# Patient Record
Sex: MALE | Race: WHITE | Employment: OTHER | URBAN - METROPOLITAN AREA
[De-identification: names, ages, dates, MRNs, and addresses within clinical notes are randomized per-mention and may not be internally consistent; named-entity substitution may affect disease eponyms.]

---

## 2018-03-15 PROBLEM — M19.90 ARTHRITIS: Status: ACTIVE | Noted: 2018-03-15

## 2018-03-27 PROBLEM — M06.00 SERONEGATIVE RHEUMATOID ARTHRITIS (HCC): Status: ACTIVE | Noted: 2018-03-27

## 2018-03-27 PROBLEM — M19.90 ARTHRITIS: Status: RESOLVED | Noted: 2018-03-15 | Resolved: 2018-03-27

## 2018-12-18 PROBLEM — H60.552 ACUTE REACTIVE OTITIS EXTERNA OF LEFT EAR: Status: ACTIVE | Noted: 2018-12-18

## 2018-12-18 PROBLEM — K14.0 TONGUE ULCERATION: Status: ACTIVE | Noted: 2018-12-06

## 2018-12-18 PROBLEM — E66.01 SEVERE OBESITY (HCC): Status: ACTIVE | Noted: 2018-12-18

## 2019-06-24 ENCOUNTER — HOSPITAL ENCOUNTER (OUTPATIENT)
Dept: GENERAL RADIOLOGY | Age: 74
Discharge: HOME | End: 2019-06-24
Attending: FAMILY MEDICINE
Payer: MEDICARE

## 2019-06-24 DIAGNOSIS — R05.9 COUGH: ICD-10-CM

## 2019-06-24 DIAGNOSIS — R06.02 SHORTNESS OF BREATH: ICD-10-CM

## 2019-06-24 PROBLEM — R00.0 TACHYCARDIA: Status: ACTIVE | Noted: 2019-06-24

## 2019-06-24 PROBLEM — J01.40 ACUTE NON-RECURRENT PANSINUSITIS: Status: ACTIVE | Noted: 2019-06-24

## 2019-06-24 PROBLEM — R73.01 ELEVATED FASTING GLUCOSE: Status: ACTIVE | Noted: 2019-06-24

## 2019-06-24 PROCEDURE — 71046 X-RAY EXAM CHEST 2 VIEWS: CPT

## 2019-06-25 NOTE — PROGRESS NOTES
Please call and advise CXR pos for pneumonia on both sides.   Needs to be seen for f/u this coming Thurs or Jonna Vivar MD

## 2019-09-23 PROBLEM — I69.322 CVA, OLD, DYSARTHRIA: Status: ACTIVE | Noted: 2019-09-23

## 2019-09-23 PROBLEM — I62.9 INTRACRANIAL HEMORRHAGE (HCC): Status: ACTIVE | Noted: 2019-09-23

## 2019-11-18 PROBLEM — I62.9 INTRACRANIAL HEMORRHAGE (HCC): Status: RESOLVED | Noted: 2019-09-23 | Resolved: 2019-11-18

## 2020-01-02 PROBLEM — R53.83 MALAISE AND FATIGUE: Status: ACTIVE | Noted: 2020-01-02

## 2020-01-02 PROBLEM — R53.81 MALAISE AND FATIGUE: Status: ACTIVE | Noted: 2020-01-02

## 2020-02-12 ENCOUNTER — HOSPITAL ENCOUNTER (OUTPATIENT)
Dept: PHYSICAL THERAPY | Age: 75
Discharge: HOME | End: 2020-02-12
Attending: FAMILY MEDICINE
Payer: MEDICARE

## 2020-02-12 DIAGNOSIS — I69.322 CVA, OLD, DYSARTHRIA: ICD-10-CM

## 2020-02-12 PROCEDURE — 92523 SPEECH SOUND LANG COMPREHEN: CPT | Performed by: SPEECH-LANGUAGE PATHOLOGIST

## 2020-02-12 NOTE — THERAPY EVALUATION
Alvaro Araseli. : 1945 [place] Secondary: [address] [place], [place] [address] [address], [address], [address] [phone]   [phone] OUTPATIENT SPEECH LANGUAGE PATHOLOGY: Initial Assessment ICD-10: Treatment Diagnosis: Dysarthria R47.1 REFERRING PHYSICIAN: Nancy Matias MD MD Orders: Evaluate and Treat Return Physician Appointment: 3 months PAST MEDICAL HISTORY:  
Mr. Bradly Lo is a 69 y.o. male who  has a past medical history of Essential hypertension, benign (2012), HLD (hyperlipidemia) (2012), Hypertrophy of prostate without urinary obstruction and other lower urinary tract symptoms (LUTS) (), Metabolic syndrome (5704), Other and unspecified hyperlipidemia (2012), and Unspecified disorder of metabolism (2012). He also  has a past surgical history that includes hx heent (). MEDICAL/REFERRING DIAGNOSIS: CVA, old, dysarthria [I69.322] DATE OF ONSET: 2019 PRIOR LEVEL OF FUNCTION: Independent PRECAUTIONS/ALLERGIES: NKDA ASSESSMENT: 
Patient is a 69year old male who was referred for speech evaluation due to Dysarthria. Patient had a CVA 2019. He went to [facility] where he spent 55 days. He then was transferred to [facility] but didn't complete the OP program in its entirety. He was referred to [facility] from his PCP. Patient felt like he still needed speech therapy. He denies frustration with his current deficits but he endorses some difficulty with speech. He reports he is having to repeat himself a lot particurly with is family. He endorses some word finding difficulties as well. He does have decreased legibility when he writes due to CVA affecting his dominant side. He denies difficulty with comprehension and cognition although he states that he is 69.   
Based on the objective data described below, the patient presents with mild-moderate speech and language deficits. He was assessed using the Assessment of Intelligibility of Dysarthric Speech. At the word level, ST correctly transcribed 37/50 words at 74% intelligibility. Noted difficulty with s words and blends with multiple attempts. He would at times drop word endings as well. At the sentence level, ST correctly transcribed 189/220 words correctly for 87% intelligibility. Some mild perseveration noted during this task. He also would read words that he thought was there but they are not. Articulation errors also observed, especially with multi syllabic words. ST also gave him portions of the [place] to assess his speech and language skills. His auditory comprehension was WNL's. His verbal expression was WNL's as well with the exception of increased thought processing with automatic speech. His decreased word finding is more noticeable at the conversation level but I feel this is due to decreased thought processing and organization. He does have increased difficulty with writing to dictation as well as his biographical information. He had increased difficulty formulating some numbers and letters. Increased thought processing observed during writing tasks as well. Oral reading difficulty noted as the sentences increased in length and complexity with again reading words that are not on the page which would affect his comprehension of what he's reading. This will also affect how the listener comprehends what he is reading as well. He is aware of his deficits but he does make some excuses as to why some things are challenging. He relies on his wife and his telephone an awful lot but I discussed with him that we needed to make sure that we have other ways of communicating in case we are without our phone and or spouse. Some things that were challenging for him, he would give an excuse such as \" I don't speak animal words. \" Although this is true, he also at one point knew names of animals etc without increased thought processing. He would benefit from 1-2 days of skilled treatment to address his dysarthria, oral reading and writing in order to increase his functional independence for ADL's. Patient will benefit from skilled intervention to address the above impairments. ?????? ? ? This section established at most recent assessment?????????? 
PROBLEM LIST (Impairments causing functional limitations): 1. CVA 2. Dysarthria GOALS: (Goals have been discussed and agreed upon with patient.) SHORT-TERM FUNCTIONAL GOALS: Time Frame: 3 months 1. Mr. Eboni Ragsdale  will verbally communicate sequences/steps and perform multi-level daily living tasks 
with  90% verbal, visual and tactile cues in order to complete activities of daily living 2. Mr. Eboni Ragsdale  will demonstrate functional writing skills at sentence level using compensatory strategies 
to complete daily activities during 80% of trials 3. Mr. Eboni Ragsdale will increase his speech intelligibility at the sentence and conversational level utilizing speech intelligibility strategies at Mod I 90% accuracy. 4. Mr. Eboni Ragsdale will participate in conversational speech utilizing word finding strategies to help aid in increasing his verbal expression at Mod I 90% accuracy. DISCHARGE GOALS: Time Frame: 12 weeks 1. Mr. Eboni Ragsdale will utilize word finding strategies to increase his functional independence of communication with ADL's at mod I.  
2. Mr. Eboni Ragsdale will increase his written expression for functional independence with ADL's at Mod I.  
REHABILITATION POTENTIAL FOR STATED GOALS: [address]: 
INTERVENTIONS PLANNED: (Benefits and precautions of therapy have been discussed with the patient.) 1. Speech therapy TREATMENT PLAN EFFECTIVE DATES: 2/12/2020 TO 5/12/2020 (90 days). FREQUENCY/DURATION: Continue to follow patient 2 times a week for 90 days to address above goals. Regarding Georgia Nitish Churchill's therapy, I certify that the treatment plan above will be carried out by a therapist or under their direction. Thank you for this referral, 
NGOZI Griffiths Referring Physician Signature: Fermín Das MD     Date SUBJECTIVE: 
Alert, pleasant Present Symptoms:   
Pain Intensity 1: 0 Current Medications:  
Current Outpatient Medications on File Prior to Encounter Medication Sig Dispense Refill  finasteride (PROSCAR) 5 mg tablet Take 5 mg by mouth daily.  atorvastatin (LIPITOR) 40 mg tablet Take 1 Tab by mouth daily. 90 Tab 1  
 amLODIPine (NORVASC) 10 mg tablet Take 1 Tab by mouth daily. 90 Tab 1  
 losartan (COZAAR) 25 mg tablet Take 1 Tab by mouth daily. 90 Tab 1  predniSONE (DELTASONE) 10 mg tablet Take 10 mg by mouth daily (with breakfast). Indications: rheumatoid arthritis 90 Tab 1  
 finasteride (PROSCAR) 5 mg tablet TAKE ONE TABLET BY MOUTH DAILY 90 Tab 1  
 hydroCHLOROthiazide (MICROZIDE) 12.5 mg capsule Take 1 Cap by mouth daily. 90 Cap 1  
 latanoprost (XALATAN) 0.005 % ophthalmic solution Administer 1 Drop to both eyes nightly. 7.5 mL 1  
 SENNA LAXATIVE 8.6 mg tablet TAKE 2 TABLETS BY MOUTH AT BEDTIME AS NEEDED FOR CONSTIPATION  0  
 GAS RELIEF 80 mg chewable tablet TAKE 1 TABLET (80 MG) BY MOUTH 4 (FOUR) TIMES A DAY AS NEEDED FOR FLATULENCE (GAS PAIN)  0  
 cholecalciferol, VITAMIN D3, (VITAMIN D3) 5,000 unit tab tablet Take  by mouth daily.  aspirin (ASPIRIN) 325 mg tablet Take 325 mg by mouth daily.  multivitamin (ONE A DAY) tablet Take 1 Tab by mouth daily.  omega-3s-dha-epa-fish oil 1,050 mg cap Take  by mouth. No current facility-administered medications on file prior to encounter. Date Last Reviewed: 2/12/2020 Social History/Home Situation:  Work/Activity History: He works OBJECTIVE: 
Objective Measure: Tool Used: National Outcomes Measurement System: Functional Communication Measures: MOTOR SPEECH Score:  Initial: 4 Most Recent: X (Date: -- ) Interpretation of Tool: This measure describes the change in functional communication status subsequent to speech-language pathology treatment of patients who have motor speech deficits. o Level 1:  The individual attempts to speak, but speech cannot be understood by familiar or unfamiliar listeners at any time. o Level 2:  The individual attempts to speak. The communication partner must assume responsibility for interpreting the message, and with consistent and maximal cues, the patient can produce short consonant-vowel combinations or automatic words that are rarely intelligible in context. 
o Level 3:  The communication partner must assume primary responsibility for interpreting the communication exchange; however, the individual is able to produce short consonant-vowel combinations or automatic words intelligibly. With consistent and moderate cueing, the individual can produce simple words and phrases intelligibly, although accuracy may vary. o Level 4: In simple structured conversation with familiar communication partners, the individual can produce simple words and phrases intelligibly. The individual usually requires moderate cueing in order to produce simple sentences intelligibly, although accuracy may vary. o Level 5:  The individual is able to speak intelligibly using simple sentences in daily routine activities with both familiar and unfamiliar communication partners. The individual occasionally requires minimal cueing to produce more complex sentences/messages in routine activities, although accuracy may vary and the individual may occasionally use compensatory strategies.  
o Level 6:  The individual is successfully able to communicate intelligibly in most activities, but some limitations in intelligibility are still apparent in vocational, avocational, and social activities. The individual rarely requires minimal cueing to produce complex sentences/messages intelligibly. The individual usually uses compensatory strategies when encountering difficulty. o Level 7: The individuals ability to successfully and independently participate in vocational, avocational, or social activities is not limited by speech production. Independent functioning may occasionally include the use of compensatory techniques. Score Level 7 Level 6 Level 5 Level 4 Level 3 Level 2 Level 1 Modifier CH CI CJ CK CL CM CN Tool Used: National Outcomes Measurement System: Functional Communication Measures: SPOKEN LANGUAGE EXPRESSION Score:  Initial: 4 Most Recent: X (Date: -- ) Interpretation of Tool: This measure describes the change in functional communication status subsequent to speech-language pathology treatment of patients who have spoken language expression deficits. o Level 1:  The individual attempts to speak, but verbalizations are not meaningful to familiar or unfamiliar communication partners at any time. o Level 2:  The individual attempts to speak, although few attempts are accurate or appropriate. The communication partner must assume responsibility for structuring the communication exchange, and with consistent and maximal cueing, the individual can only occasionally produce automatic and/or imitative words and phrases that are rarely meaningful in context. 
o Level 3:  The communication partner must assume responsibility for structuring the communication exchange, and with consistent and moderate cueing, the individual can produce words and phrases that are appropriate and meaningful in context. 
o Level 4:  The individual is successfully able to initiate communication using spoken language in simple, structured conversations in routine daily activities with familiar communication partners.  The individual usually requires moderate cueing, but is able to demonstrate use of simple sentences (i.e., semantics, syntax, and morphology) and rarely uses complex sentences/messages. o Level 5:  The individual is successfully able to initiate communication using spoken language in structured conversations with both familiar and unfamiliar communication partners. The individual occasionally requires minimal cueing to frame more complex sentences in messages. The individual occasionally self-cues when encountering difficulty. o Level 6:  The individual is successfully able to communicate in most activities, but some limitations in spoken language are still apparent in vocational, avocational, and social activities. The individual rarely requires minimal cueing to frame complex sentences. The individual usually self-cues when encountering difficulty. o Level 7: The individuals ability to successfully and independently participate in vocational, avocational, and social activities is not limited by spoken language skills. Independent functioning may occasionally include use of self-cueing. Score Level 7 Level 6 Level 5 Level 4 Level 3 Level 2 Level 1 Modifier CH CI CJ CK CL CM CN Oral Motor Structure/Speech:  Oral-Motor Structure/Motor Speech Face: No impairment Labial: Right droop Dentition: Natural 
Oral Hygiene: good Lingual: No impairment Velum: No impairment Mandible: No impairment Apraxic Characteristics: Fewer errors in automatic speech than volitional speech, Inconsistent errors, Numerous attempts at the word, Awareness of errors Dysarthric Characteristics: Blended word boundaries, Increased rate, Imprecise Intelligibility: Impaired Word Intelligibility (%): 74 % Phrase Intelligibility (%): 100 % Sentence Intelligibility (%): 87 % Conversation Intelligibility (%): 90 % Intonation: Impaired Rate: Impaired Prosody: Impaired Overall Impairment Severity: Mild-moderate SPEECH-LANGUAGE COGNITIVE EVALUATION Tests Given:Assessment of Intelligibility of Dysarthric speech and portions of the [place] Mental Status: 
Neurologic State: Alert Orientation Level: Oriented X4 Cognition: Follows commands; Appropriate for age attention/concentration Perception: Appears intact Perseveration: (noted during some structured tasks) Safety/Judgement: Awareness of environment Motor Speech: 
Oral-Motor Structure/Motor Speech Face: No impairment Labial: Right droop Dentition: Natural 
Oral Hygiene: good Lingual: No impairment Velum: No impairment Mandible: No impairment Apraxic Characteristics: Fewer errors in automatic speech than volitional speech; Inconsistent errors;Numerous attempts at the word; Awareness of errors Dysarthric Characteristics: Blended word boundaries; Increased rate; Imprecise Intelligibility: Impaired Word Intelligibility (%): 74 % Phrase Intelligibility (%): 100 % Sentence Intelligibility (%): 87 % Conversation Intelligibility (%): 90 % Intonation: Impaired Rate: Impaired Prosody: Impaired Overall Impairment Severity: Mild-moderate Auditory Comprehension: Auditory Comprehension Auditory Impairment: No  
 
Reading Comprehension:  
Reading Comprehension Visual Impairment: No impairment;Glasses/contacts Pre-Morbid Reading Status: Illiterate Scanning/Tracking : No impairment Words : No 
Sentence: No Impairment Paragraph : No impairment Oral Reading: Impaired (%) Interfering Components: Processing time; Attention to detail; Attention Effective Techniques: Pacing;Remove environmental distractions; Tactile/visual cues Overall Impairment Severity: Mild-moderate Verbal Expression: 
 Verbal Expression Primary Mode of Expression: Verbal 
Initiation: No impairment Automatic Speech Task: No impairment(increased thought process) Repetition: No impairment Naming: Impaired Confrontation (%): 100 % Convergent (%): 80 % Divergent (%): 80 % Responsive (%): 100 % Sentence Completion: No impairment Sentence Formulation: Impaired (%) Conversation: Dysarthric;Fluent Speech Characteristics: Perseveration; Word retrieval 
Interfering Components: Impaired thought organization; Attention Effective Techniques: Provide extra time;Remove environmental distractions Overall Impairment: Mild-moderate Written Expression:  
Written Expression Pre-Morbid Dominant Hand: Right Pre-Morbid Writing Skills: WNL Legibility : Decreased legibility Dictation : Impaired Letters (%): 100 % Numbers (%): 50 % Words (%): 50 % Phrases (%): 50 % Sentences (%): 50 % Formulation: Impaired Copying (%): 85 % Tracing (%): 100 % Name: Yes Address: No 
Word Level (%): 50 % Sentence Level (%): 50 % Paragraph Level (%): 25 % Interfering Components: Attention to detail; Thought organization Overall Impairment Severity: Moderate PraNgmatics: WNL's 
 
Assessment only; No treatment(s) provided today 
__________________________________________________________________________________________________ History of Present Injury/Illness (Reason for Referral): CVA Treatment Assessment:  Evaluation completed. Progression/Medical Necessity:  
· Patient is expected to demonstrate progress in expressive communication, reading ability and writing ability to decrease assistance required communication, increase independence with activities of daily living and increase communication with family/caregivers. Compliance with Program/Exercises: Will assess as treatment progresses}. Reason for Continuation of Services/Other Comments: 
· Patient continues to require skilled intervention due to CVA. Recommendations/Intent for next treatment session: \"Treatment next visit will focus on goals\". Total Treatment Duration: 
Time In: 0900 Time Out: 1000 NGOZI Michel

## 2020-02-25 ENCOUNTER — HOSPITAL ENCOUNTER (OUTPATIENT)
Dept: PHYSICAL THERAPY | Age: 75
Discharge: HOME | End: 2020-02-25
Attending: FAMILY MEDICINE
Payer: MEDICARE

## 2020-02-25 PROCEDURE — 92507 TX SP LANG VOICE COMM INDIV: CPT | Performed by: SPEECH-LANGUAGE PATHOLOGIST

## 2020-02-25 NOTE — PROGRESS NOTES
Johnmaryana Price. : 1945  [place]  Secondary: [address] at [address], [address] [place] [address],[address], [place] [address].  [phone]   [phone]         OUTPATIENT SPEECH LANGUAGE PATHOLOGY: Daily Note 1    ICD-10: Treatment Diagnosis: Dysarthria R47.1   REFERRING PHYSICIAN: Je Farnsworth MD MD Orders: Evaluate and Treat  Return Physician Appointment: 3 months  PAST MEDICAL HISTORY:   Mr. Yesika Swanson is a 69 y.o. male who  has a past medical history of Essential hypertension, benign (2012), HLD (hyperlipidemia) (2012), Hypertrophy of prostate without urinary obstruction and other lower urinary tract symptoms (LUTS) (), Metabolic syndrome (), Other and unspecified hyperlipidemia (2012), and Unspecified disorder of metabolism (2012). He also  has a past surgical history that includes hx heent (). MEDICAL/REFERRING DIAGNOSIS: Dysarthria following cerebral infarction [I69.322]  DATE OF ONSET: 2019  PRIOR LEVEL OF FUNCTION: Independent   PRECAUTIONS/ALLERGIES: NKDA   ASSESSMENT:  Patient is a 69year old male who was referred for speech evaluation due to Dysarthria. Patient had a CVA 2019. He went to [facility] where he spent 55 days. He then was transferred to [facility] but didn't complete the OP program in its entirety. He was referred to [facility] from his PCP. Patient felt like he still needed speech therapy. He denies frustration with his current deficits but he endorses some difficulty with speech. He reports he is having to repeat himself a lot particurly with is family. He endorses some word finding difficulties as well. He does have decreased legibility when he writes due to CVA affecting his dominant side. He denies difficulty with comprehension and cognition although he states that he is 69.    Based on the objective data described below, the patient presents with mild-moderate speech and language deficits. He was assessed using the Assessment of Intelligibility of Dysarthric Speech. At the word level, ST correctly transcribed 37/50 words at 74% intelligibility. Noted difficulty with s words and blends with multiple attempts. He would at times drop word endings as well. At the sentence level,  correctly transcribed 189/220 words correctly for 87% intelligibility. Some mild perseveration noted during this task. He also would read words that he thought was there but       Patient attended speech therapy along with his wife. We reviewed speech intelligibility and word finding strategies. Also discussed oral motor exercises. See below for specifics. Patient will benefit from skilled intervention to address the above impairments. ?????? ? ? This section established at most recent assessment??????????  PROBLEM LIST (Impairments causing functional limitations):  1. CVA  2. Dysarthria   GOALS: (Goals have been discussed and agreed upon with patient.)  SHORT-TERM FUNCTIONAL GOALS: Time Frame: 3 months   1. Mr. Umesh Anderson  will verbally communicate sequences/steps and perform multi-level daily living tasks  with  90% verbal, visual and tactile cues in order to complete activities of daily living  2. Mr. Umesh Anderson  will demonstrate functional writing skills at sentence level using compensatory strategies  to complete daily activities during 80% of trials  3. Mr. Umesh Anderson will increase his speech intelligibility at the sentence and conversational level utilizing speech intelligibility strategies at Mod I 90% accuracy. 4. Mr. Umesh Anderson will participate in conversational speech utilizing word finding strategies to help aid in increasing his verbal expression at Mod I 90% accuracy. DISCHARGE GOALS: Time Frame: 12 weeks  1.  Mr. Umesh Anderson will utilize word finding strategies to increase his functional independence of communication with ADL's at mod I.   2. Mr. Umesh Anderson will increase his written expression for functional independence with ADL's at [address] [facility] STATED GOALS: Roro Mcdaniel OF CARE:  INTERVENTIONS PLANNED: (Benefits and precautions of therapy have been discussed with the patient.)  1. Speech therapy  TREATMENT PLAN EFFECTIVE DATES: 2/12/2020 TO 5/12/2020 (90 days). FREQUENCY/DURATION: Continue to follow patient 2 times a week for 90 days to address above goals. Regarding Azra Garza JrNeeraj's therapy, I certify that the treatment plan above will be carried out by a therapist or under their direction. Thank you for this referral,  NGOZI Malcolm Ed CCC-SLP                   Referring Physician Signature: Donovan Dimas MD     Date      SUBJECTIVE:  Alert, pleasant   Present Symptoms:    Pain Intensity 1: 0  Current Medications:   Current Outpatient Medications on File Prior to Encounter   Medication Sig Dispense Refill    hydroCHLOROthiazide (MICROZIDE) 12.5 mg capsule Take 2 Caps by mouth daily. 90 Cap 1    finasteride (PROSCAR) 5 mg tablet Take 5 mg by mouth daily.  atorvastatin (LIPITOR) 40 mg tablet Take 1 Tab by mouth daily. 90 Tab 1    amLODIPine (NORVASC) 10 mg tablet Take 1 Tab by mouth daily. 90 Tab 1    losartan (COZAAR) 25 mg tablet Take 1 Tab by mouth daily. 90 Tab 1    predniSONE (DELTASONE) 10 mg tablet Take 10 mg by mouth daily (with breakfast). Indications: rheumatoid arthritis 90 Tab 1    finasteride (PROSCAR) 5 mg tablet TAKE ONE TABLET BY MOUTH DAILY 90 Tab 1    latanoprost (XALATAN) 0.005 % ophthalmic solution Administer 1 Drop to both eyes nightly. 7.5 mL 1    SENNA LAXATIVE 8.6 mg tablet TAKE 2 TABLETS BY MOUTH AT BEDTIME AS NEEDED FOR CONSTIPATION  0    GAS RELIEF 80 mg chewable tablet TAKE 1 TABLET (80 MG) BY MOUTH 4 (FOUR) TIMES A DAY AS NEEDED FOR FLATULENCE (GAS PAIN)  0    cholecalciferol, VITAMIN D3, (VITAMIN D3) 5,000 unit tab tablet Take  by mouth daily.  aspirin (ASPIRIN) 325 mg tablet Take 325 mg by mouth daily.  multivitamin (ONE A DAY) tablet Take 1 Tab by mouth daily.  omega-3s-dha-epa-fish oil 1,050 mg cap Take  by mouth. No current facility-administered medications on file prior to encounter. Date Last Reviewed: 2/25/2020  Social History/Home Situation:       Work/Activity History: He works     OBJECTIVE:  Objective Measure: Tool Used: National Outcomes Measurement System: Functional Communication Measures: MOTOR SPEECH  Score:  Initial: 4 Most Recent: X (Date: -- )   Interpretation of Tool: This measure describes the change in functional communication status subsequent to speech-language pathology treatment of patients who have motor speech deficits. o Level 1:  The individual attempts to speak, but speech cannot be understood by familiar or unfamiliar listeners at any time. o Level 2:  The individual attempts to speak. The communication partner must assume responsibility for interpreting the message, and with consistent and maximal cues, the patient can produce short consonant-vowel combinations or automatic words that are rarely intelligible in context.  o Level 3:  The communication partner must assume primary responsibility for interpreting the communication exchange; however, the individual is able to produce short consonant-vowel combinations or automatic words intelligibly. With consistent and moderate cueing, the individual can produce simple words and phrases intelligibly, although accuracy may vary. o Level 4: In simple structured conversation with familiar communication partners, the individual can produce simple words and phrases intelligibly. The individual usually requires moderate cueing in order to produce simple sentences intelligibly, although accuracy may vary. o Level 5:  The individual is able to speak intelligibly using simple sentences in daily routine activities with both familiar and unfamiliar communication partners.  The individual occasionally requires minimal cueing to produce more complex sentences/messages in routine activities, although accuracy may vary and the individual may occasionally use compensatory strategies. o Level 6:  The individual is successfully able to communicate intelligibly in most activities, but some limitations in intelligibility are still apparent in vocational, avocational, and social activities. The individual rarely requires minimal cueing to produce complex sentences/messages intelligibly. The individual usually uses compensatory strategies when encountering difficulty. o Level 7: The individuals ability to successfully and independently participate in vocational, avocational, or social activities is not limited by speech production. Independent functioning may occasionally include the use of compensatory techniques. Score Level 7 Level 6 Level 5 Level 4 Level 3 Level 2 Level 1   Modifier CH CI CJ CK CL CM CN       Tool Used: National Outcomes Measurement System: Functional Communication Measures: SPOKEN LANGUAGE EXPRESSION  Score:  Initial: 4 Most Recent: X (Date: -- )   Interpretation of Tool: This measure describes the change in functional communication status subsequent to speech-language pathology treatment of patients who have spoken language expression deficits. o Level 1:  The individual attempts to speak, but verbalizations are not meaningful to familiar or unfamiliar communication partners at any time. o Level 2:  The individual attempts to speak, although few attempts are accurate or appropriate.   The communication partner must assume responsibility for structuring the communication exchange, and with consistent and maximal cueing, the individual can only occasionally produce automatic and/or imitative words and phrases that are rarely meaningful in context.  o Level 3:  The communication partner must assume responsibility for structuring the communication exchange, and with consistent and moderate cueing, the individual can produce words and phrases that are appropriate and meaningful in context.  o Level 4:  The individual is successfully able to initiate communication using spoken language in simple, structured conversations in routine daily activities with familiar communication partners. The individual usually requires moderate cueing, but is able to demonstrate use of simple sentences (i.e., semantics, syntax, and morphology) and rarely uses complex sentences/messages. o Level 5:  The individual is successfully able to initiate communication using spoken language in structured conversations with both familiar and unfamiliar communication partners. The individual occasionally requires minimal cueing to frame more complex sentences in messages. The individual occasionally self-cues when encountering difficulty. o Level 6:  The individual is successfully able to communicate in most activities, but some limitations in spoken language are still apparent in vocational, avocational, and social activities. The individual rarely requires minimal cueing to frame complex sentences. The individual usually self-cues when encountering difficulty. o Level 7: The individuals ability to successfully and independently participate in vocational, avocational, and social activities is not limited by spoken language skills. Independent functioning may occasionally include use of self-cueing.   Score Level 7 Level 6 Level 5 Level 4 Level 3 Level 2 Level 1   Modifier CH CI CJ CK CL CM CN       Oral Motor Structure/Speech:       SPEECH-LANGUAGE COGNITIVE EVALUATION  Tests Given:Assessment of Intelligibility of Dysarthric speech and portions of the [place]    Mental Status: Alert, pleasant    Motor Speech:      ORAL MOTOR EXERCISES:  Exaggerate Vowels: Yes  Reps: 5  Sets : 1                    Labial Protrusion: Yes  Reps : 5  Sets : 1  Labial Pucker: Yes  Reps: 5  Sets : 1                    Labial Seal-Cheeks Extended: Yes  Reps : 5  Sets : 1                            Lingual Lateralization-Exterior: Yes  Reps : 5  Sets: 1                                      \"OO-EE\": Yes  Reps: 5  Sets : 1                  Auditory Comprehension:     Reading Comprehension:     Verbal Expression:    Written Expression:       PraNgmatics: WNL's    Speech/Language Activities: Activities/Procedures listed utilized to improve expressive communication. Required minimal cueing to decrease assistance required communication. __________________________________________________________________________________________________  History of Present Injury/Illness (Reason for Referral): CVA  Treatment Assessment:  Evaluation completed. Progression/Medical Necessity:   · Patient is expected to demonstrate progress in expressive communication, reading ability and writing ability to decrease assistance required communication, increase independence with activities of daily living and increase communication with family/caregivers. Compliance with Program/Exercises: Will assess as treatment progresses}. Reason for Continuation of Services/Other Comments:  · Patient continues to require skilled intervention due to CVA. Recommendations/Intent for next treatment session: \"Treatment next visit will focus on goals\". Total Treatment Duration:  Time In: 0930  Time Out: [address]

## 2020-02-28 ENCOUNTER — HOSPITAL ENCOUNTER (OUTPATIENT)
Dept: PHYSICAL THERAPY | Age: 75
Discharge: HOME | End: 2020-02-28
Attending: FAMILY MEDICINE
Payer: MEDICARE

## 2020-02-28 PROCEDURE — 92507 TX SP LANG VOICE COMM INDIV: CPT | Performed by: SPEECH-LANGUAGE PATHOLOGIST

## 2020-02-28 NOTE — PROGRESS NOTES
Shaheen Rivera. : 1945  [place]  Secondary: [address], [address] [place] [address],[address], [place] [address].  [phone]   [phone]         OUTPATIENT SPEECH LANGUAGE PATHOLOGY: Daily Note 2    ICD-10: Treatment Diagnosis: Dysarthria R47.1   REFERRING PHYSICIAN: Fermín Das MD MD Orders: Evaluate and Treat  Return Physician Appointment: 3 months  PAST MEDICAL HISTORY:   Mr. Pernell Salas is a 69 y.o. male who  has a past medical history of Essential hypertension, benign (2012), HLD (hyperlipidemia) (2012), Hypertrophy of prostate without urinary obstruction and other lower urinary tract symptoms (LUTS) (10/18/5244), Metabolic syndrome (), Other and unspecified hyperlipidemia (2012), and Unspecified disorder of metabolism (2012). He also  has a past surgical history that includes hx heent (). MEDICAL/REFERRING DIAGNOSIS: Dysarthria following cerebral infarction [I69.322]  DATE OF ONSET: 2019  PRIOR LEVEL OF FUNCTION: Independent   PRECAUTIONS/ALLERGIES: NKDA   ASSESSMENT:  Patient is a 69year old male who was referred for speech evaluation due to Dysarthria. Patient had a CVA 2019. He went to [facility] where he spent 55 days. He then was transferred to [facility] but didn't complete the OP program in its entirety. He was referred to [facility] from his PCP. Patient felt like he still needed speech therapy. He denies frustration with his current deficits but he endorses some difficulty with speech. He reports he is having to repeat himself a lot particurly with is family. He endorses some word finding difficulties as well. He does have decreased legibility when he writes due to CVA affecting his dominant side. He denies difficulty with comprehension and cognition although he states that he is 69.    Based on the objective data described below, the patient presents with mild-moderate speech and language deficits. He was assessed using the Assessment of Intelligibility of Dysarthric Speech. At the word level, ST correctly transcribed 37/50 words at 74% intelligibility. Noted difficulty with s words and blends with multiple attempts. He would at times drop word endings as well. At the sentence level, ST correctly transcribed 189/220 words correctly for 87% intelligibility. Some mild perseveration noted during this task. He also would read words that he thought was there but       Patient attended speech therapy. He has no complaints but he is fatigued due to working late last night. He completed speech tasks at min A. We reviewed speech intelligibility and word finding strategies. Also discussed oral motor exercises. See below for specifics. Patient will benefit from skilled intervention to address the above impairments. ?????? ? ? This section established at most recent assessment??????????  PROBLEM LIST (Impairments causing functional limitations):  1. CVA  2. Dysarthria   GOALS: (Goals have been discussed and agreed upon with patient.)  SHORT-TERM FUNCTIONAL GOALS: Time Frame: 3 months   1. Mr. Anju Monroe  will verbally communicate sequences/steps and perform multi-level daily living tasks  with  90% verbal, visual and tactile cues in order to complete activities of daily living  2. Mr. Anju Monroe  will demonstrate functional writing skills at sentence level using compensatory strategies  to complete daily activities during 80% of trials  3. Mr. Anju Monroe will increase his speech intelligibility at the sentence and conversational level utilizing speech intelligibility strategies at Mod I 90% accuracy. 4. Mr. Anju Monroe will participate in conversational speech utilizing word finding strategies to help aid in increasing his verbal expression at Mod I 90% accuracy. DISCHARGE GOALS: Time Frame: 12 weeks  1.  Mr. Anju Monroe will utilize word finding strategies to increase his functional independence of communication with ADL's at mod I.   2. Mr. Geoff Szymanski will increase his written expression for functional independence with ADL's at Mod I.   REHABILITATION POTENTIAL FOR STATED GOALS: [address]:  INTERVENTIONS PLANNED: (Benefits and precautions of therapy have been discussed with the patient.)  1. Speech therapy  TREATMENT PLAN EFFECTIVE DATES: 2/12/2020 TO 5/12/2020 (90 days). FREQUENCY/DURATION: Continue to follow patient 2 times a week for 90 days to address above goals. Regarding Vandana Ortiz JrNeeraj's therapy, I certify that the treatment plan above will be carried out by a therapist or under their direction. Thank you for this referral,  NGOZI Alvarez Ed CCC-SLP                   Referring Physician Signature: Annette Otero MD     Date      SUBJECTIVE:  Alert, pleasant   Present Symptoms:       Current Medications:   Current Outpatient Medications on File Prior to Encounter   Medication Sig Dispense Refill    hydroCHLOROthiazide (MICROZIDE) 12.5 mg capsule Take 2 Caps by mouth daily. 90 Cap 1    finasteride (PROSCAR) 5 mg tablet Take 5 mg by mouth daily.  atorvastatin (LIPITOR) 40 mg tablet Take 1 Tab by mouth daily. 90 Tab 1    amLODIPine (NORVASC) 10 mg tablet Take 1 Tab by mouth daily. 90 Tab 1    losartan (COZAAR) 25 mg tablet Take 1 Tab by mouth daily. 90 Tab 1    predniSONE (DELTASONE) 10 mg tablet Take 10 mg by mouth daily (with breakfast). Indications: rheumatoid arthritis 90 Tab 1    finasteride (PROSCAR) 5 mg tablet TAKE ONE TABLET BY MOUTH DAILY 90 Tab 1    latanoprost (XALATAN) 0.005 % ophthalmic solution Administer 1 Drop to both eyes nightly.  7.5 mL 1    SENNA LAXATIVE 8.6 mg tablet TAKE 2 TABLETS BY MOUTH AT BEDTIME AS NEEDED FOR CONSTIPATION  0    GAS RELIEF 80 mg chewable tablet TAKE 1 TABLET (80 MG) BY MOUTH 4 (FOUR) TIMES A DAY AS NEEDED FOR FLATULENCE (GAS PAIN)  0    cholecalciferol, VITAMIN D3, (VITAMIN D3) 5,000 unit tab tablet Take  by mouth daily.  aspirin (ASPIRIN) 325 mg tablet Take 325 mg by mouth daily.  multivitamin (ONE A DAY) tablet Take 1 Tab by mouth daily.  omega-3s-dha-epa-fish oil 1,050 mg cap Take  by mouth. No current facility-administered medications on file prior to encounter. Date Last Reviewed: 2/25/2020  Social History/Home Situation:       Work/Activity History: He works     OBJECTIVE:  Objective Measure: Tool Used: National Outcomes Measurement System: Functional Communication Measures: MOTOR SPEECH  Score:  Initial: 4 Most Recent: X (Date: -- )   Interpretation of Tool: This measure describes the change in functional communication status subsequent to speech-language pathology treatment of patients who have motor speech deficits. o Level 1:  The individual attempts to speak, but speech cannot be understood by familiar or unfamiliar listeners at any time. o Level 2:  The individual attempts to speak. The communication partner must assume responsibility for interpreting the message, and with consistent and maximal cues, the patient can produce short consonant-vowel combinations or automatic words that are rarely intelligible in context.  o Level 3:  The communication partner must assume primary responsibility for interpreting the communication exchange; however, the individual is able to produce short consonant-vowel combinations or automatic words intelligibly. With consistent and moderate cueing, the individual can produce simple words and phrases intelligibly, although accuracy may vary. o Level 4: In simple structured conversation with familiar communication partners, the individual can produce simple words and phrases intelligibly. The individual usually requires moderate cueing in order to produce simple sentences intelligibly, although accuracy may vary.   o Level 5:  The individual is able to speak intelligibly using simple sentences in daily routine activities with both familiar and unfamiliar communication partners. The individual occasionally requires minimal cueing to produce more complex sentences/messages in routine activities, although accuracy may vary and the individual may occasionally use compensatory strategies. o Level 6:  The individual is successfully able to communicate intelligibly in most activities, but some limitations in intelligibility are still apparent in vocational, avocational, and social activities. The individual rarely requires minimal cueing to produce complex sentences/messages intelligibly. The individual usually uses compensatory strategies when encountering difficulty. o Level 7: The individuals ability to successfully and independently participate in vocational, avocational, or social activities is not limited by speech production. Independent functioning may occasionally include the use of compensatory techniques. Score Level 7 Level 6 Level 5 Level 4 Level 3 Level 2 Level 1   Modifier CH CI CJ CK CL CM CN       Tool Used: National Outcomes Measurement System: Functional Communication Measures: SPOKEN LANGUAGE EXPRESSION  Score:  Initial: 4 Most Recent: X (Date: -- )   Interpretation of Tool: This measure describes the change in functional communication status subsequent to speech-language pathology treatment of patients who have spoken language expression deficits. o Level 1:  The individual attempts to speak, but verbalizations are not meaningful to familiar or unfamiliar communication partners at any time. o Level 2:  The individual attempts to speak, although few attempts are accurate or appropriate.   The communication partner must assume responsibility for structuring the communication exchange, and with consistent and maximal cueing, the individual can only occasionally produce automatic and/or imitative words and phrases that are rarely meaningful in context.  o Level 3:  The communication partner must assume responsibility for structuring the communication exchange, and with consistent and moderate cueing, the individual can produce words and phrases that are appropriate and meaningful in context.  o Level 4:  The individual is successfully able to initiate communication using spoken language in simple, structured conversations in routine daily activities with familiar communication partners. The individual usually requires moderate cueing, but is able to demonstrate use of simple sentences (i.e., semantics, syntax, and morphology) and rarely uses complex sentences/messages. o Level 5:  The individual is successfully able to initiate communication using spoken language in structured conversations with both familiar and unfamiliar communication partners. The individual occasionally requires minimal cueing to frame more complex sentences in messages. The individual occasionally self-cues when encountering difficulty. o Level 6:  The individual is successfully able to communicate in most activities, but some limitations in spoken language are still apparent in vocational, avocational, and social activities. The individual rarely requires minimal cueing to frame complex sentences. The individual usually self-cues when encountering difficulty. o Level 7: The individuals ability to successfully and independently participate in vocational, avocational, and social activities is not limited by spoken language skills. Independent functioning may occasionally include use of self-cueing.   Score Level 7 Level 6 Level 5 Level 4 Level 3 Level 2 Level 1   Modifier CH CI CJ CK CL CM CN       Oral Motor Structure/Speech:       SPEECH-LANGUAGE COGNITIVE EVALUATION  Tests Given:Assessment of Intelligibility of Dysarthric speech and portions of the [place]    Mental Status: Alert, pleasant    Motor Speech:  Patient participated in conversational speech starters with min to mod decreased intelligibility due to increase rate of speech and slurred speech. His speech was judged to be about 80% intelligible. Cues to slow down and over articulate his speech. ORAL MOTOR EXERCISES:                                                                                                                                                                            PraNgmatics: WNL's    Speech/Language Activities: Activities/Procedures listed utilized to improve expressive communication. Required minimal cueing to decrease assistance required communication. __________________________________________________________________________________________________  History of Present Injury/Illness (Reason for Referral): CVA  Treatment Assessment:  Evaluation completed. Progression/Medical Necessity:   · Patient is expected to demonstrate progress in expressive communication, reading ability and writing ability to decrease assistance required communication, increase independence with activities of daily living and increase communication with family/caregivers. Compliance with Program/Exercises: Will assess as treatment progresses}. Reason for Continuation of Services/Other Comments:  · Patient continues to require skilled intervention due to CVA. Recommendations/Intent for next treatment session: \"Treatment next visit will focus on goals\". Total Treatment Duration:  Time In: 0930  Time Out: [address] Liz Cruz

## 2020-03-04 ENCOUNTER — HOSPITAL ENCOUNTER (OUTPATIENT)
Dept: PHYSICAL THERAPY | Age: 75
Discharge: HOME | End: 2020-03-04
Attending: FAMILY MEDICINE
Payer: MEDICARE

## 2020-03-04 PROCEDURE — 92507 TX SP LANG VOICE COMM INDIV: CPT | Performed by: SPEECH-LANGUAGE PATHOLOGIST

## 2020-03-04 NOTE — PROGRESS NOTES
Moise Beckman. : 1945  [place]  Secondary: [address] at [id number] [place] [address], [facility] [address].  [phone]   [phone]         OUTPATIENT SPEECH LANGUAGE PATHOLOGY: Daily Note 3    ICD-10: Treatment Diagnosis: Dysarthria R47.1   REFERRING PHYSICIAN: James Garcia MD MD Orders: Evaluate and Treat  Return Physician Appointment: 3 months  PAST MEDICAL HISTORY:   Mr. Anirudh Mcallister is a 69 y.o. male who  has a past medical history of Essential hypertension, benign (2012), HLD (hyperlipidemia) (2012), Hypertrophy of prostate without urinary obstruction and other lower urinary tract symptoms (LUTS) (), Metabolic syndrome (), Other and unspecified hyperlipidemia (2012), and Unspecified disorder of metabolism (2012). He also  has a past surgical history that includes hx heent (). MEDICAL/REFERRING DIAGNOSIS: Dysarthria following cerebral infarction [I69.322]  DATE OF ONSET: 2019  PRIOR LEVEL OF FUNCTION: Independent   PRECAUTIONS/ALLERGIES: NKDA   ASSESSMENT:  Patient is a 69year old male who was referred for speech evaluation due to Dysarthria. Patient had a CVA 2019. He went to [facility] where he spent 55 days. He then was transferred to [facility] but didn't complete the OP program in its entirety. He was referred to Health system from his PCP. Patient felt like he still needed speech therapy. He denies frustration with his current deficits but he endorses some difficulty with speech. He reports he is having to repeat himself a lot particurly with is family. He endorses some word finding difficulties as well. He does have decreased legibility when he writes due to CVA affecting his dominant side. He denies difficulty with comprehension and cognition although he states that he is 69.    Based on the objective data described below, the patient presents with mild-moderate speech and language deficits. He was assessed using the Assessment of Intelligibility of Dysarthric Speech. At the word level, ST correctly transcribed 37/50 words at 74% intelligibility. Noted difficulty with s words and blends with multiple attempts. He would at times drop word endings as well. At the sentence level,  correctly transcribed 189/220 words correctly for 87% intelligibility. Some mild perseveration noted during this task. He also would read words that he thought was there but       Patient attended speech therapy. He has no complaints but he is fatigued due to working late last night. He completed speech tasks at min A. We reviewed speech intelligibility and word finding strategies. Also discussed oral motor exercises. See below for specifics. Patient will benefit from skilled intervention to address the above impairments. ?????? ? ? This section established at most recent assessment??????????  PROBLEM LIST (Impairments causing functional limitations):  1. CVA  2. Dysarthria   GOALS: (Goals have been discussed and agreed upon with patient.)  SHORT-TERM FUNCTIONAL GOALS: Time Frame: 3 months   1. Mr. Mera Boo  will verbally communicate sequences/steps and perform multi-level daily living tasks  with  90% verbal, visual and tactile cues in order to complete activities of daily living  2. Mr. Mera Boo  will demonstrate functional writing skills at sentence level using compensatory strategies  to complete daily activities during 80% of trials  3. Mr. Mera Boo will increase his speech intelligibility at the sentence and conversational level utilizing speech intelligibility strategies at Mod I 90% accuracy. 4. Mr. Mera Boo will participate in conversational speech utilizing word finding strategies to help aid in increasing his verbal expression at Mod I 90% accuracy. DISCHARGE GOALS: Time Frame: 12 weeks  1.  Mr. Mera Boo will utilize word finding strategies to increase his functional independence of communication with ADL's at mod I.   2. Mr. Leyla Malloy will increase his written expression for functional independence with ADL's at Mod I.   REHABILITATION POTENTIAL FOR STATED GOALS: [address]:  INTERVENTIONS PLANNED: (Benefits and precautions of therapy have been discussed with the patient.)  1. Speech therapy  TREATMENT PLAN EFFECTIVE DATES: 2/12/2020 TO 5/12/2020 (90 days). FREQUENCY/DURATION: Continue to follow patient 2 times a week for 90 days to address above goals. Regarding Graham Mcnallydiane Churchill's therapy, I certify that the treatment plan above will be carried out by a therapist or under their direction. Thank you for this referral,  NGOZI Larios Ed CCC-SLP                   Referring Physician Signature: Claribel Ray MD     Date      SUBJECTIVE:  Alert, pleasant   Present Symptoms:    Pain Intensity 1: 0  Current Medications:   Current Outpatient Medications on File Prior to Encounter   Medication Sig Dispense Refill    hydroCHLOROthiazide (MICROZIDE) 12.5 mg capsule Take 2 Caps by mouth daily. 90 Cap 1    finasteride (PROSCAR) 5 mg tablet Take 5 mg by mouth daily.  atorvastatin (LIPITOR) 40 mg tablet Take 1 Tab by mouth daily. 90 Tab 1    amLODIPine (NORVASC) 10 mg tablet Take 1 Tab by mouth daily. 90 Tab 1    losartan (COZAAR) 25 mg tablet Take 1 Tab by mouth daily. 90 Tab 1    predniSONE (DELTASONE) 10 mg tablet Take 10 mg by mouth daily (with breakfast). Indications: rheumatoid arthritis 90 Tab 1    finasteride (PROSCAR) 5 mg tablet TAKE ONE TABLET BY MOUTH DAILY 90 Tab 1    latanoprost (XALATAN) 0.005 % ophthalmic solution Administer 1 Drop to both eyes nightly.  7.5 mL 1    SENNA LAXATIVE 8.6 mg tablet TAKE 2 TABLETS BY MOUTH AT BEDTIME AS NEEDED FOR CONSTIPATION  0    GAS RELIEF 80 mg chewable tablet TAKE 1 TABLET (80 MG) BY MOUTH 4 (FOUR) TIMES A DAY AS NEEDED FOR FLATULENCE (GAS PAIN)  0    cholecalciferol, VITAMIN D3, (VITAMIN D3) 5,000 unit tab tablet Take  by mouth daily.  aspirin (ASPIRIN) 325 mg tablet Take 325 mg by mouth daily.  multivitamin (ONE A DAY) tablet Take 1 Tab by mouth daily.  omega-3s-dha-epa-fish oil 1,050 mg cap Take  by mouth. No current facility-administered medications on file prior to encounter. Date Last Reviewed: 3/4/2020  Social History/Home Situation:       Work/Activity History: He works     OBJECTIVE:  Objective Measure: Tool Used: National Outcomes Measurement System: Functional Communication Measures: MOTOR SPEECH  Score:  Initial: 4 Most Recent: X (Date: -- )   Interpretation of Tool: This measure describes the change in functional communication status subsequent to speech-language pathology treatment of patients who have motor speech deficits. o Level 1:  The individual attempts to speak, but speech cannot be understood by familiar or unfamiliar listeners at any time. o Level 2:  The individual attempts to speak. The communication partner must assume responsibility for interpreting the message, and with consistent and maximal cues, the patient can produce short consonant-vowel combinations or automatic words that are rarely intelligible in context.  o Level 3:  The communication partner must assume primary responsibility for interpreting the communication exchange; however, the individual is able to produce short consonant-vowel combinations or automatic words intelligibly. With consistent and moderate cueing, the individual can produce simple words and phrases intelligibly, although accuracy may vary. o Level 4: In simple structured conversation with familiar communication partners, the individual can produce simple words and phrases intelligibly. The individual usually requires moderate cueing in order to produce simple sentences intelligibly, although accuracy may vary.   o Level 5:  The individual is able to speak intelligibly using simple sentences in daily routine activities with both familiar and unfamiliar communication partners. The individual occasionally requires minimal cueing to produce more complex sentences/messages in routine activities, although accuracy may vary and the individual may occasionally use compensatory strategies. o Level 6:  The individual is successfully able to communicate intelligibly in most activities, but some limitations in intelligibility are still apparent in vocational, avocational, and social activities. The individual rarely requires minimal cueing to produce complex sentences/messages intelligibly. The individual usually uses compensatory strategies when encountering difficulty. o Level 7: The individuals ability to successfully and independently participate in vocational, avocational, or social activities is not limited by speech production. Independent functioning may occasionally include the use of compensatory techniques. Score Level 7 Level 6 Level 5 Level 4 Level 3 Level 2 Level 1   Modifier CH CI CJ CK CL CM CN       Tool Used: National Outcomes Measurement System: Functional Communication Measures: SPOKEN LANGUAGE EXPRESSION  Score:  Initial: 4 Most Recent: X (Date: -- )   Interpretation of Tool: This measure describes the change in functional communication status subsequent to speech-language pathology treatment of patients who have spoken language expression deficits. o Level 1:  The individual attempts to speak, but verbalizations are not meaningful to familiar or unfamiliar communication partners at any time. o Level 2:  The individual attempts to speak, although few attempts are accurate or appropriate.   The communication partner must assume responsibility for structuring the communication exchange, and with consistent and maximal cueing, the individual can only occasionally produce automatic and/or imitative words and phrases that are rarely meaningful in context.  o Level 3:  The communication partner must assume responsibility for structuring the communication exchange, and with consistent and moderate cueing, the individual can produce words and phrases that are appropriate and meaningful in context.  o Level 4:  The individual is successfully able to initiate communication using spoken language in simple, structured conversations in routine daily activities with familiar communication partners. The individual usually requires moderate cueing, but is able to demonstrate use of simple sentences (i.e., semantics, syntax, and morphology) and rarely uses complex sentences/messages. o Level 5:  The individual is successfully able to initiate communication using spoken language in structured conversations with both familiar and unfamiliar communication partners. The individual occasionally requires minimal cueing to frame more complex sentences in messages. The individual occasionally self-cues when encountering difficulty. o Level 6:  The individual is successfully able to communicate in most activities, but some limitations in spoken language are still apparent in vocational, avocational, and social activities. The individual rarely requires minimal cueing to frame complex sentences. The individual usually self-cues when encountering difficulty. o Level 7: The individuals ability to successfully and independently participate in vocational, avocational, and social activities is not limited by spoken language skills. Independent functioning may occasionally include use of self-cueing. Score Level 7 Level 6 Level 5 Level 4 Level 3 Level 2 Level 1   Modifier CH CI CJ CK CL CM CN       Oral Motor Structure/Speech:       SPEECH-LANGUAGE COGNITIVE EVALUATION  Tests Given:Assessment of Intelligibility of Dysarthric speech and portions of the [place]    Mental Status: Alert, pleasant    Motor Speech:  1.  Patient participated in conversational speech starters with min to mod decreased intelligibility due to increase rate of speech and slurred speech. His speech was judged to be about 80% intelligible. Cues to slow down and over articulate his speech. 2. Read a list of words from previous session due to increased articulation errors. Read list     3. Read vegetables from price list with some articulation errors especially on s words     4. Read fruits from price list with mild articulation     4. Read s words from price list at: Independently at 100% accuracy     5. Read s words (CV, VC, CVC) at Mod I 95% intelligibility    ORAL MOTOR EXERCISES:                                                                                                                                                                            PraNgmatics: WNL's    Speech/Language Activities: Activities/Procedures listed utilized to improve expressive communication. Required minimal cueing to decrease assistance required communication. __________________________________________________________________________________________________  History of Present Injury/Illness (Reason for Referral): CVA  Treatment Assessment:  Evaluation completed. Progression/Medical Necessity:   · Patient is expected to demonstrate progress in expressive communication, reading ability and writing ability to decrease assistance required communication, increase independence with activities of daily living and increase communication with family/caregivers. Compliance with Program/Exercises: Will assess as treatment progresses}. Reason for Continuation of Services/Other Comments:  · Patient continues to require skilled intervention due to CVA. Recommendations/Intent for next treatment session: \"Treatment next visit will focus on goals\". Total Treatment Duration:  Time In: 0845  Time Out: JUAN [address]ff [address], [place]. Marga Dow

## 2020-03-12 ENCOUNTER — HOSPITAL ENCOUNTER (OUTPATIENT)
Dept: PHYSICAL THERAPY | Age: 75
Discharge: HOME | End: 2020-03-12
Attending: FAMILY MEDICINE
Payer: MEDICARE

## 2020-03-12 NOTE — PROGRESS NOTES
Speech Pathology      Patient is currently hospitalized. ST called patient's wife and she stated that patient may be getting d/c today. She will call and update us regarding his d/c planning. NGOZI Gutiérrez

## 2020-03-19 ENCOUNTER — APPOINTMENT (OUTPATIENT)
Dept: PHYSICAL THERAPY | Age: 75
End: 2020-03-19
Attending: FAMILY MEDICINE
Payer: MEDICARE

## 2020-03-26 ENCOUNTER — APPOINTMENT (OUTPATIENT)
Dept: PHYSICAL THERAPY | Age: 75
End: 2020-03-26
Attending: FAMILY MEDICINE
Payer: MEDICARE

## 2020-04-15 PROBLEM — E87.6 HYPOKALEMIA: Status: ACTIVE | Noted: 2020-04-15

## 2020-04-15 PROBLEM — G40.909 SEIZURE DISORDER (HCC): Status: ACTIVE | Noted: 2020-04-15
